# Patient Record
(demographics unavailable — no encounter records)

---

## 2025-06-10 NOTE — HISTORY OF PRESENT ILLNESS
[de-identified] : 77-year-old male last seen Oct 2023 by Dr. Mcpherson, with history of PsO and PsA x nearly 50 years, on MTX for 30 years, currently MTX 15 mg/weekly with rheumatologist at [facility], presenting today for evaluation of the following:  # Rash in the groin x a few weeks. Was very itchy, now less itchy with Neosporin.   #Psoriasis for x years - is on methotrexate for psoriatic arthritis managed by rheumatologist.  - only uses Vaseline for skin lesions - flaring on right knee.   #Red spots throughout body, mostly on trunk and chest. Requests FBSE. Last exam with Dr. Mcpherson in 2023. No new, evolving, or symptomatic lesions.  No history of skin cancer No family history of skin cancer Works in construction.  [FreeTextEntry1] : f/u psoriasis and spots on body

## 2025-06-10 NOTE — PHYSICAL EXAM
[Alert] : alert [Oriented x 3] : ~L oriented x 3 [Well Nourished] : well nourished [No Visual Lymphadenopathy] : no visual  lymphadenopathy [Conjunctiva Non-injected] : conjunctiva non-injected [No Clubbing] : no clubbing [No Edema] : no edema [No Bromhidrosis] : no bromhidrosis [No Chromhidrosis] : no chromhidrosis [Full Body Skin Exam Performed] : performed [FreeTextEntry3] : - Centrofacial erythema with some telangiectasias - Limited scalp exam clear - pink plaques on bilateral elbows and right extnesor knee with overlying micaceous scale and fingernails with pitting and onycholysis of thumbnails - Well-demarcated salmon-pink plaques and papules in the inframammary creases with similar plaque in the scrotum and tip of the penis - bilateral feet with hyperkeratotic plaques on the soles of the feet and yellow, thickened toenails - brown macules scattered throughout trunk/extremities with no concerning features on dermoscopy - Several scattered, red, partially blanching papules on the trunk and extremities.

## 2025-06-10 NOTE — PHYSICAL EXAM
[Alert] : alert [Oriented x 3] : ~L oriented x 3 [Well Nourished] : well nourished [Conjunctiva Non-injected] : conjunctiva non-injected [No Visual Lymphadenopathy] : no visual  lymphadenopathy [No Clubbing] : no clubbing [No Bromhidrosis] : no bromhidrosis [No Edema] : no edema [No Chromhidrosis] : no chromhidrosis [Full Body Skin Exam Performed] : performed [FreeTextEntry3] : - Centrofacial erythema with some telangiectasias - Limited scalp exam clear - pink plaques on bilateral elbows and right extnesor knee with overlying micaceous scale and fingernails with pitting and onycholysis of thumbnails - Well-demarcated salmon-pink plaques and papules in the inframammary creases with similar plaque in the scrotum and tip of the penis - bilateral feet with hyperkeratotic plaques on the soles of the feet and yellow, thickened toenails - brown macules scattered throughout trunk/extremities with no concerning features on dermoscopy - Several scattered, red, partially blanching papules on the trunk and extremities.

## 2025-06-10 NOTE — HISTORY OF PRESENT ILLNESS
[FreeTextEntry1] : f/u psoriasis and spots on body  [de-identified] : 77-year-old male last seen Oct 2023 by Dr. Mcpherson, with history of PsO and PsA x nearly 50 years, on MTX for 30 years, currently MTX 15 mg/weekly with rheumatologist at [facility], presenting today for evaluation of the following:  # Rash in the groin x a few weeks. Was very itchy, now less itchy with Neosporin.   #Psoriasis for x years - is on methotrexate for psoriatic arthritis managed by rheumatologist.  - only uses Vaseline for skin lesions - flaring on right knee.   #Red spots throughout body, mostly on trunk and chest. Requests FBSE. Last exam with Dr. Mcpherson in 2023. No new, evolving, or symptomatic lesions.  No history of skin cancer No family history of skin cancer Works in construction.

## 2025-06-10 NOTE — ASSESSMENT
[FreeTextEntry1] : # Psoriasis and psoriatic arthritis - chronic, flaring, now with groin involvement 8% BSA including groin - continue methotrexate per rheumatology (managed by outside Rheum at [facility], see HPI) - Diagnosis reviewed.  - STOP neosporin.  - Start HCT 2.5% cream BID to affected areas in the groin and inguinal creases for 2 weeks, then stop. SED. - Then start tacrolimus ointment BID.  - Start clobetasol prop ointment to affected areas on the upper and lower extensors BID x 2 wks, on, 1 week off. SED.  - RTC 4 wks.   # Melanocytic nevi # Cherry angiomas # Screening for skin cancer - no suspicious lesions - Sun protection was discussed. I made the patient aware of the need for year-round protection. ABCDEs of melanoma were also reviewed. - Self-skin checks were also reviewed, rtc sooner if changed noted - Counseled patient to monitor for changes, including mole monitoring and self-skin exams - TBSE completed today, no lesions concerning for malignancy. Next TBSE due 1 year.  RTC 4 wks.

## 2025-06-10 NOTE — END OF VISIT
[TextEntry] : Physician Assistant Statement: Case was discussed and supervised by attending physician.

## 2025-06-10 NOTE — ASSESSMENT
[FreeTextEntry1] : # Psoriasis and psoriatic arthritis - chronic, flaring, now with groin involvement 8% BSA including groin - continue methotrexate per rheumatology (managed by outside Rheum at [facility], see HPI) - Diagnosis reviewed.  - STOP neosporin.  - Start HCT 2.5% cream BID to affected areas in the groin and inguinal creases for 2 weeks, then stop. SED. - Then start tacrolimus ointment BID.  - Start clobetasol prop ointment to affected areas on the upper and lower extensors BID x 2 wks, on, 1 week off. SED.  - RTC 4 wks.   # Melanocytic nevi # Cherry angiomas # Screening for skin cancer - no suspicious lesions - Sun protection was discussed. I made the patient aware of the need for year-round protection. ABCDEs of melanoma were also reviewed. - Self-skin checks were also reviewed, rtc sooner if changed noted - Counseled patient to monitor for changes, including mole monitoring and self-skin exams - TBSE completed today, no lesions concerning for malignancy. Next TBSE due 1 year.  RTC 4 wks.

## 2025-07-23 NOTE — ASSESSMENT
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 S/p doxycycline by urology, keto cream by urology S/p punch bx 7/17/25 at outside practice, [facility]. Pending pathology [facility] in [place], [place] T: [phone] F: [phone] - Per pt, no viral swab obtained to r/o genital HSV- swabbed today, sent STAT.  Medications reviewed.  - Given strong clinical suspicion and after confirming with my attending, sent 3d course of Valtrex 500 mg BID.  - Refilled mupirocin per Patient request, can apply TID- do not favor bacterial culture.  - Tasked myself to f/u with Pt next week to f/u re bx results.  # Psoriasis and psoriatic arthritis - chronic, stable #PIH - continue methotrexate per rheumatology (managed by outside Rheum at [facility], see HPI) - Diagnosis reviewed.  - C/w tacrolimus ointment BID.  - C/w clobetasol prop ointment to affected areas on the upper and lower extensors BID PRN flares. SED.  RTC pending results from culture and bx results

## 2025-07-23 NOTE — HISTORY OF PRESENT ILLNESS
[FreeTextEntry1] : rpa: rash [de-identified] : 77-year-old male last seen in early June, with history of PsO and PsA x nearly 50 years, on MTX for 30 years, currently MTX 15 mg/weekly with rheumatologist at the [facility], presenting today for evaluation of the following:  # F/u Rash in the groin x weeks. Clinically consistent with PsO flare at . Started 2 weeks of HCT 2.5% cream at  followed by tacrolimus ointment. However, notes that psoriasis has cleared but painful lesions have developed on the penis. He has seen multiple physicians since his last visit, most recently s/p punch bx at the tip of the penis at a dermatologist in [facility] 4 days ago, pending results. Also s/p doxycycline, unknown duration course, keto cream. He has been applying mupirocin ointment, also prescribed by urology, but has run out and requests a refill. Unsure if this is helpful?   #Psoriasis for x years - is on methotrexate for psoriatic arthritis managed by rheumatologist.  - only uses Vaseline for skin lesions - Re-started clobetasol prop ointment at . Helped his extremities- his psoriasis plaques are now well controlled.  No history of skin cancer No family history of skin cancer Works in construction.

## 2025-07-23 NOTE — ASSESSMENT
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 S/p doxycycline by urology, keto cream by urology S/p punch bx 7/17/25 at outside practice, [facility]. Pending pathology [facility] in [place], [place] T: [phone] F: [phone] - Per pt, no viral swab obtained to r/o genital HSV- swabbed today, sent STAT.  Medications reviewed.  - Given strong clinical suspicion and after confirming with my attending, sent 3d course of Valtrex 500 mg BID.  - Refilled mupirocin per Patient request, can apply TID- do not favor bacterial culture.  - Tasked myself to f/u with Pt next week to f/u re bx results.  # Psoriasis and psoriatic arthritis - chronic, stable #PIH - continue methotrexate per rheumatology (managed by outside Rheum at [facility], see HPI) - Diagnosis reviewed.  - C/w tacrolimus ointment BID.  - C/w clobetasol prop ointment to affected areas on the upper and lower extensors BID PRN flares. SED.  RTC pending results from culture and bx results

## 2025-07-23 NOTE — PHYSICAL EXAM
[Alert] : alert [Oriented x 3] : ~L oriented x 3 [Well Nourished] : well nourished [Conjunctiva Non-injected] : conjunctiva non-injected [No Visual Lymphadenopathy] : no visual  lymphadenopathy [No Clubbing] : no clubbing [No Edema] : no edema [No Bromhidrosis] : no bromhidrosis [No Chromhidrosis] : no chromhidrosis [FreeTextEntry3] : Focused skin exam performed  The relevant portions of the exam were performed today  AAOx3, NAD, well-appearing / pleasant Focused examination within normal limits with the exception of:  See photos, 07/21/2025: Large ulcerated plaque with scalloped erythematous borders on the ventral glans penis with two adjacent smaller erosions and visible granulation tissue and a smaller ulcerated punch biopsy site at about 11 o'clock Minimal erythematous and hyperpigmented patch on the scrotum- psoriasis has greatly improved since last visit wiht topicals at the scrotum, penis, and inguinal folds - No inguinal lymphadenopathy

## 2025-07-23 NOTE — HISTORY OF PRESENT ILLNESS
[FreeTextEntry1] : rpa: rash [de-identified] : 77-year-old male last seen in early June, with history of PsO and PsA x nearly 50 years, on MTX for 30 years, currently MTX 15 mg/weekly with rheumatologist at the [facility], presenting today for evaluation of the following:  # F/u Rash in the groin x weeks. Clinically consistent with PsO flare at . Started 2 weeks of HCT 2.5% cream at  followed by tacrolimus ointment. However, notes that psoriasis has cleared but painful lesions have developed on the penis. He has seen multiple physicians since his last visit, most recently s/p punch bx at the tip of the penis at a dermatologist in [facility] 4 days ago, pending results. Also s/p doxycycline, unknown duration course, keto cream. He has been applying mupirocin ointment, also prescribed by urology, but has run out and requests a refill. Unsure if this is helpful?   #Psoriasis for x years - is on methotrexate for psoriatic arthritis managed by rheumatologist.  - only uses Vaseline for skin lesions - Re-started clobetasol prop ointment at . Helped his extremities- his psoriasis plaques are now well controlled.  No history of skin cancer No family history of skin cancer Works in construction.

## 2025-07-23 NOTE — PHYSICAL EXAM
[Alert] : alert [Oriented x 3] : ~L oriented x 3 [Well Nourished] : well nourished [Conjunctiva Non-injected] : conjunctiva non-injected [No Visual Lymphadenopathy] : no visual  lymphadenopathy [No Clubbing] : no clubbing [No Edema] : no edema [No Bromhidrosis] : no bromhidrosis [No Chromhidrosis] : no chromhidrosis [FreeTextEntry3] : Focused skin exam performed  The relevant portions of the exam were performed today  AAOx3, NAD, well-appearing / pleasant Focused examination within normal limits with the exception of:  See photos, 07/21/2025: Large ulcerated plaque with scalloped erythematous borders on the ventral glans penis with two adjacent smaller erosions and visible granulation tissue and a smaller ulcerated punch biopsy site at about 11 o'clock Minimal erythematous and hyperpigmented patch on the scrotum- psoriasis has greatly improved since last visit wiht topicals at the scrotum, penis, and inguinal folds - No inguinal lymphadenopathy 4 = No assist / stand by assistance